# Patient Record
Sex: FEMALE | Race: BLACK OR AFRICAN AMERICAN
[De-identification: names, ages, dates, MRNs, and addresses within clinical notes are randomized per-mention and may not be internally consistent; named-entity substitution may affect disease eponyms.]

---

## 2018-05-15 ENCOUNTER — HOSPITAL ENCOUNTER (EMERGENCY)
Dept: HOSPITAL 54 - ER | Age: 28
Discharge: HOME | End: 2018-05-15
Payer: COMMERCIAL

## 2018-05-15 VITALS — DIASTOLIC BLOOD PRESSURE: 75 MMHG | SYSTOLIC BLOOD PRESSURE: 120 MMHG

## 2018-05-15 VITALS — WEIGHT: 190 LBS | BODY MASS INDEX: 31.65 KG/M2 | HEIGHT: 65 IN
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
## 2018-05-15 DIAGNOSIS — T19.2XXA: Primary | ICD-10-CM
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
## 2018-05-15 PROCEDURE — Z7610: HCPCS

## 2018-05-15 PROCEDURE — A4606 OXYGEN PROBE USED W OXIMETER: HCPCS

## 2019-04-17 ENCOUNTER — HOSPITAL ENCOUNTER (EMERGENCY)
Dept: HOSPITAL 54 - ER | Age: 29
Discharge: HOME | End: 2019-04-17
Payer: COMMERCIAL

## 2019-04-17 VITALS — DIASTOLIC BLOOD PRESSURE: 67 MMHG | SYSTOLIC BLOOD PRESSURE: 115 MMHG

## 2019-04-17 VITALS — HEIGHT: 66 IN | WEIGHT: 213 LBS | BODY MASS INDEX: 34.23 KG/M2

## 2019-04-17 DIAGNOSIS — R51: Primary | ICD-10-CM

## 2019-04-17 PROCEDURE — 99283 EMERGENCY DEPT VISIT LOW MDM: CPT

## 2019-04-17 NOTE — NUR
ROBERT SELF 100% IN RA.  AOX4. NO SOB OR ACUTE RESPIRATORY DISTRESS.  
COMPLAINING OF  SEVERE HEADACHE. AFEBRIEL. VSS. STATED SHE HAD  AN EPISODE  
MIGRAINE WHEN SHE TAKE  PARACETAMOL (FROM [place])  IT WENT AWAY, BUT IF 
ITS REALLY HURT  SHE WENT TO THE HOSPITAL FOR TREATMENT.  PATIENT  REFUSED  TO 
PLACE AN IV PER  SHE WANT  ALL HER MEDICINE  BY MOUTH. MD AWARE.  ALL DUE 
MEDICINE  GIVEN PER MATT REQUESTED.